# Patient Record
Sex: MALE | HISPANIC OR LATINO | URBAN - METROPOLITAN AREA
[De-identification: names, ages, dates, MRNs, and addresses within clinical notes are randomized per-mention and may not be internally consistent; named-entity substitution may affect disease eponyms.]

---

## 2020-09-20 PROCEDURE — U0003 INFECTIOUS AGENT DETECTION BY NUCLEIC ACID (DNA OR RNA); SEVERE ACUTE RESPIRATORY SYNDROME CORONAVIRUS 2 (SARS-COV-2) (CORONAVIRUS DISEASE [COVID-19]), AMPLIFIED PROBE TECHNIQUE, MAKING USE OF HIGH THROUGHPUT TECHNOLOGIES AS DESCRIBED BY CMS-2020-01-R: HCPCS | Performed by: FAMILY MEDICINE

## 2020-09-24 ENCOUNTER — TELEPHONE (OUTPATIENT)
Dept: URGENT CARE | Facility: CLINIC | Age: 50
End: 2020-09-24

## 2020-09-24 NOTE — TELEPHONE ENCOUNTER
----- Message from Rajan Vasquez MD sent at 9/22/2020  1:23 PM EDT -----  Please inform patient of negative test result

## 2020-09-24 NOTE — TELEPHONE ENCOUNTER
Have called patient several times with no call back and no way to leave message. I called sister and she gave me another number [phone] and could not reach patient on that number either. Results of Covid not detected have been mailed to patient.

## 2020-09-25 NOTE — TELEPHONE ENCOUNTER
"Pt called about COVID-19 test results. Pt said \"we never called him to give him the results\". I told pt he was not detected for COVID. Pt understands CDC guidelines. Pt is feeling better.   "